# Patient Record
Sex: FEMALE | Race: WHITE | Employment: OTHER | URBAN - METROPOLITAN AREA
[De-identification: names, ages, dates, MRNs, and addresses within clinical notes are randomized per-mention and may not be internally consistent; named-entity substitution may affect disease eponyms.]

---

## 2023-02-14 ENCOUNTER — HOSPITAL ENCOUNTER (OUTPATIENT)
Dept: GENERAL RADIOLOGY | Age: 73
Discharge: HOME | End: 2023-02-14
Payer: MEDICARE

## 2023-02-14 ENCOUNTER — TRANSCRIBE ORDER (OUTPATIENT)
Dept: GENERAL RADIOLOGY | Age: 73
End: 2023-02-14

## 2023-02-14 DIAGNOSIS — S90.31XA CONTUSION OF RIGHT FOOT: Primary | ICD-10-CM

## 2023-02-14 DIAGNOSIS — S90.31XA CONTUSION OF RIGHT FOOT: ICD-10-CM

## 2023-02-14 PROCEDURE — 73630 X-RAY EXAM OF FOOT: CPT

## 2024-03-08 ENCOUNTER — OFFICE VISIT (OUTPATIENT)
Facility: CLINIC | Age: 74
End: 2024-03-08
Payer: MEDICARE

## 2024-03-08 VITALS
WEIGHT: 193 LBS | SYSTOLIC BLOOD PRESSURE: 160 MMHG | DIASTOLIC BLOOD PRESSURE: 98 MMHG | HEART RATE: 99 BPM | OXYGEN SATURATION: 97 % | BODY MASS INDEX: 31.02 KG/M2 | TEMPERATURE: 97.7 F | RESPIRATION RATE: 18 BRPM | HEIGHT: 66 IN

## 2024-03-08 DIAGNOSIS — G47.9 SLEEPING DIFFICULTY: ICD-10-CM

## 2024-03-08 DIAGNOSIS — Z11.59 ENCOUNTER FOR HEPATITIS C SCREENING TEST FOR LOW RISK PATIENT: ICD-10-CM

## 2024-03-08 DIAGNOSIS — Z76.89 ENCOUNTER TO ESTABLISH CARE: ICD-10-CM

## 2024-03-08 DIAGNOSIS — Z13.220 SCREENING CHOLESTEROL LEVEL: ICD-10-CM

## 2024-03-08 DIAGNOSIS — M17.0 BILATERAL PRIMARY OSTEOARTHRITIS OF KNEE: ICD-10-CM

## 2024-03-08 DIAGNOSIS — R03.0 ELEVATED BLOOD PRESSURE READING: Primary | ICD-10-CM

## 2024-03-08 DIAGNOSIS — R03.0 ELEVATED BLOOD PRESSURE READING: ICD-10-CM

## 2024-03-08 DIAGNOSIS — R79.89 OTHER SPECIFIED ABNORMAL FINDINGS OF BLOOD CHEMISTRY: ICD-10-CM

## 2024-03-08 DIAGNOSIS — Z12.31 ENCOUNTER FOR SCREENING MAMMOGRAM FOR MALIGNANT NEOPLASM OF BREAST: ICD-10-CM

## 2024-03-08 DIAGNOSIS — Z02.83 ENCOUNTER FOR DRUG SCREENING: ICD-10-CM

## 2024-03-08 DIAGNOSIS — Z85.828 HISTORY OF SKIN CANCER: ICD-10-CM

## 2024-03-08 PROCEDURE — 99204 OFFICE O/P NEW MOD 45 MIN: CPT | Performed by: FAMILY MEDICINE

## 2024-03-08 PROCEDURE — 1123F ACP DISCUSS/DSCN MKR DOCD: CPT | Performed by: FAMILY MEDICINE

## 2024-03-08 RX ORDER — LORAZEPAM 0.5 MG/1
0.5 TABLET ORAL PRN
COMMUNITY

## 2024-03-08 SDOH — ECONOMIC STABILITY: FOOD INSECURITY: WITHIN THE PAST 12 MONTHS, THE FOOD YOU BOUGHT JUST DIDN'T LAST AND YOU DIDN'T HAVE MONEY TO GET MORE.: NEVER TRUE

## 2024-03-08 SDOH — ECONOMIC STABILITY: HOUSING INSECURITY
IN THE LAST 12 MONTHS, WAS THERE A TIME WHEN YOU DID NOT HAVE A STEADY PLACE TO SLEEP OR SLEPT IN A SHELTER (INCLUDING NOW)?: NO

## 2024-03-08 SDOH — ECONOMIC STABILITY: INCOME INSECURITY: HOW HARD IS IT FOR YOU TO PAY FOR THE VERY BASICS LIKE FOOD, HOUSING, MEDICAL CARE, AND HEATING?: NOT HARD AT ALL

## 2024-03-08 SDOH — ECONOMIC STABILITY: FOOD INSECURITY: WITHIN THE PAST 12 MONTHS, YOU WORRIED THAT YOUR FOOD WOULD RUN OUT BEFORE YOU GOT MONEY TO BUY MORE.: NEVER TRUE

## 2024-03-08 ASSESSMENT — PATIENT HEALTH QUESTIONNAIRE - PHQ9
SUM OF ALL RESPONSES TO PHQ QUESTIONS 1-9: 0
SUM OF ALL RESPONSES TO PHQ QUESTIONS 1-9: 0
SUM OF ALL RESPONSES TO PHQ9 QUESTIONS 1 & 2: 0
1. LITTLE INTEREST OR PLEASURE IN DOING THINGS: NOT AT ALL
SUM OF ALL RESPONSES TO PHQ QUESTIONS 1-9: 0
SUM OF ALL RESPONSES TO PHQ QUESTIONS 1-9: 0
2. FEELING DOWN, DEPRESSED OR HOPELESS: NOT AT ALL

## 2024-03-08 NOTE — PROGRESS NOTES
Subjective  Chief Complaint   Patient presents with    [facility]     HPI:  Charmaine Bell is a 74 y.o. female with medical problems as listed below who presents to establish care. Former patient of Dr. Curry.     Past medical history: Bilateral knee OA, hx of BCC  Medications: Ativan 0.5mg PRN for occasional difficulty sleeping (#30 tabs lasts her one year and tends to take 1/2 tab as needed), probiotic, vitamin D 2000 units daily, Advil daily  Allergies: NKDA  Specialists: Dermatology  Surgical history: tonsillectomy  Family history: Lung cancer (dad).   Social history: No tobacco, sometimes wine with dinner, no drugs. After penitentiary, bought elliptical and is on it 20 minutes per day. Up and down steps at home. Works part time for [employer]. Retired from teaching at [employer].   Colon cancer screening: Last colonoscopy in 2015, patient is unsure whether polyps were removed. Was instructed to return in 5 years but she never went back due to pandemic. She can't recall the GI practice.   Breast cancer screening: Mammogram never done. Agreeable to exam today.   Osteoporosis screening: Declines  Immunizations: Due for PCV20, influenza, RSV, Shingrix,     Knee pain: Has tried Voltaren gel and it didn't help. Taking Advil daily.      Patient Active Problem List   Diagnosis    Bilateral primary osteoarthritis of knee    History of skin cancer    Sleeping difficulty     Family History   Problem Relation Age of Onset    Lung Cancer Father       Social History     Tobacco Use    Smoking status: Never    Smokeless tobacco: Never   Substance Use Topics    Alcohol use: Yes     Comment: occ    Drug use: Never     Current Outpatient Medications on File Prior to Visit   Medication Sig Dispense Refill    LORazepam (ATIVAN) 0.5 MG tablet Take 1 tablet by mouth as needed for Anxiety (prn sleep aid).       No current facility-administered medications on file prior to visit.     No Known Allergies  Review of Systems

## 2024-03-08 NOTE — PROGRESS NOTES
\"Have you been to the ER, urgent care clinic since your last visit?  Hospitalized since your last visit?\"    NO    “Have you seen or consulted any other health care providers outside of [facility] since your last visit?”    NO    “Have you had a colorectal cancer screening such as a colonoscopy/FIT/Cologuard?    NO     Have you had a mammogram?”   NO      Chief Complaint   Patient presents with    Establish Care     BP (!) 167/73 (Site: Right Upper Arm, Position: Sitting, Cuff Size: Large Adult)   Pulse 99   Temp 97.7 °F (36.5 °C) (Temporal)   Resp 18   Ht 1.676 m (5' 6\")   Wt 87.5 kg (193 lb)   SpO2 97%   BMI 31.15 kg/m²

## 2024-03-14 LAB
10OH-CARBAZEPINE/CREAT UR CFM: NOT DETECTED NG/MG{CREAT}
6MAM UR QL CFM: NEGATIVE
6MAM/CREAT UR: NOT DETECTED NG/MG CREAT
7AMINOCLONAZEPAM/CREAT UR: NOT DETECTED NG/MG CREAT
8OH-AMOXAPINE UR QL: NOT DETECTED
8OH-LOXAPINE/CREAT UR CFM: NOT DETECTED NG/MG{CREAT}
A-OH ALPRAZ/CREAT UR: NOT DETECTED NG/MG CREAT
A-OH-TRIAZOLAM/CREAT UR CFM: NOT DETECTED NG/MG CREAT
ALFENTANIL/CREAT UR CFM: NOT DETECTED NG/MG CREAT
ALPHA-HYDROXYMIDAZOLAM, URINE: NOT DETECTED NG/MG CREAT
ALPRAZ/CREAT UR CFM: NOT DETECTED NG/MG CREAT
AMINO CHLOROPYRIDINE, URINE: NOT DETECTED
AMITRIP UR QL CFM: NOT DETECTED
AMOBARBITAL UR QL CFM: NOT DETECTED
AMOXAPINE UR QL: NOT DETECTED
AMPHET/CREAT UR: NOT DETECTED NG/MG CREAT
AMPHETAMINES UR QL CFM: NEGATIVE
ANALGESICS NON-NARCOTIC UR QL: NORMAL
ANTICONVULSANTS UR: NEGATIVE
ANTIDEPRESSANTS UR QL: NEGATIVE
ANTIHISTAMINES UR QL CFM: NEGATIVE
ANTIPSYCHOTICS UR QL SCN: NEGATIVE
APAP UR QL: NOT DETECTED
ARIPIPRAZOLE/CREAT UR CFM: NOT DETECTED NG/MG{CREAT}
ASENAPINE, URINE: NOT DETECTED
ATENOLOL UR QL: NOT DETECTED
ATOMOXETINE/CREAT UR CFM: NOT DETECTED NG/MG{CREAT}
BACLOFEN/CREAT UR CFM: NOT DETECTED NG/MG{CREAT}
BARBITAL UR QL CFM: NOT DETECTED
BARBITURATES UR QL CFM: NEGATIVE
BENZODIAZ UR QL CFM: NORMAL
BENZTROPINE UR QL: NOT DETECTED
BROMPHENIRAMINE UR QL: NOT DETECTED
BUPIVACAINE/CREAT UR CFM: NOT DETECTED NG/MG{CREAT}
BUPRENORPHINE UR QL CFM: NEGATIVE
BUPRENORPHINE/CREAT UR: NOT DETECTED NG/MG CREAT
BUPROPION UR QL: NOT DETECTED
BUTABARBITAL UR QL CFM: NOT DETECTED
BUTALBITAL UR QL CFM: NOT DETECTED
BUTORPHANOL UR QL CFM: NOT DETECTED
BZE/CREAT UR: NOT DETECTED NG/MG CREAT
CAFFEINE UR QL: NOT DETECTED
CANNABINOIDS UR QL CFM: NEGATIVE
CARBAMAZEPINE UR QL: NOT DETECTED
CARBOXYTHC/CREAT UR: NOT DETECTED NG/MG CREAT
CARISOPRODOL UR QL CFM: NOT DETECTED
CHLORPHENIR UR QL CFM: NOT DETECTED
CHLORPROMAZINE UR QL CFM: NOT DETECTED
CITALOPRAM UR QL: NOT DETECTED
CLOBAZAM UR QL: NOT DETECTED
CLOMIPRAMINE UR QL: NOT DETECTED
CLONAZEPAM/CREAT UR CFM: NOT DETECTED NG/MG CREAT
CLONIDINE UR QL: NOT DETECTED
CLOZAPINE UR QL: NOT DETECTED
COCAETHYLENE/CREAT UR CFM: NOT DETECTED NG/MG CREAT
COCAINE UR QL CFM: NEGATIVE
COCAINE/CREAT UR CFM: NOT DETECTED NG/MG CREAT
CODEINE/CREAT UR: NOT DETECTED NG/MG CREAT
CREAT UR-MCNC: 126 MG/DL
CYCLOBENZAPRINE UR QL CFM: NOT DETECTED
D-METHORPHAN UR QL: NOT DETECTED
D-METHORPHAN+LEVORPHANOL UR QL: NOT DETECTED
DESALKYLFLURAZ/CREAT UR: NOT DETECTED NG/MG CREAT
DESIPRAMINE UR QL CFM: NOT DETECTED
DESMETHYLCYCLOBENZAPRINE, URINE: NOT DETECTED
DESMETHYLFLUNITRAZEPAM, URINE: NOT DETECTED NG/MG CREAT
DHC/CREAT UR: NOT DETECTED NG/MG CREAT
DIAZEPAM/CREAT UR: NOT DETECTED NG/MG CREAT
DICLOFENAC/CREAT UR CFM: NOT DETECTED NG/MG{CREAT}
DIETHYLPROPION UR QL CFM: NOT DETECTED
DILTIAZEM UR QL: NOT DETECTED
DIPHENHY UR QL: NOT DETECTED
DOXEPIN UR QL CFM: NOT DETECTED
DOXYLAMINE UR QL: NOT DETECTED
DRUGS UR CFM: NEGATIVE
DRUGS UR: NORMAL
DULOXETINE UR QL CFM: NOT DETECTED
EDDP/CREAT UR: NOT DETECTED NG/MG CREAT
EPHEDRIN+PSEUDO UR QL: NOT DETECTED
ETHANOL UR CFM-MCNC: NOT DETECTED G/DL
ETHANOL UR QL CFM: NEGATIVE
EZOGABINE/CREAT UR CFM: NOT DETECTED NG/MG{CREAT}
FENTANYL UR QL CFM: NEGATIVE
FENTANYL/CREAT UR: NOT DETECTED NG/MG CREAT
FLUNITRAZEPAM UR QL CFM: NOT DETECTED NG/MG CREAT
FLUOXETINE UR QL CFM: NOT DETECTED
FLUPHENAZINE UR QL: NOT DETECTED
FLUVOXAMINE UR QL: NOT DETECTED
GABAPENTIN UR QL: NOT DETECTED
GUAIFENESIN/CREAT UR CFM: NOT DETECTED NG/MG{CREAT}
HALLUCINOGENS UR: NEGATIVE
HALOPERIDOL UR QL CFM: NOT DETECTED
HYDROCODONE/CREAT UR: NOT DETECTED NG/MG CREAT
HYDROMORPHONE/CREAT UR: NOT DETECTED NG/MG CREAT
HYDROXYZINE UR QL: NOT DETECTED
HYPNOTICS UR QL SCN: NEGATIVE
IBUPROFEN UR QL: PRESENT
ILOPERIDONE, URINE: NOT DETECTED
IMIPRAMINE UR QL CFM: NOT DETECTED
KETAMINE UR QL CFM: NOT DETECTED
KETOPROFEN/CREAT UR CFM: NOT DETECTED NG/MG{CREAT}
LAMOTRIGINE/CREAT UR CFM: NOT DETECTED NG/MG{CREAT}
LEVEL OF DETECTION: NORMAL
LEVETIRACETAM/CREAT UR CFM: NOT DETECTED NG/MG{CREAT}
LIDOCAIN UR QL: NOT DETECTED
LOCAL ANESTHETICS SCREEN URINE: NEGATIVE
LORAZEPAM/CREAT UR: 25 NG/MG CREAT
LOXAPINE UR QL: NOT DETECTED
LURASIDONE UR CFM-MCNC: NOT DETECTED NG/ML
M-CPP UR QL: NOT DETECTED
MAPROTILINE UR QL: NOT DETECTED
MDA/CREAT UR: NOT DETECTED NG/MG CREAT
MDEA UR QL: NOT DETECTED
MDMA/CREAT UR: NOT DETECTED NG/MG CREAT
ME-PHENIDATE UR QL CFM: NOT DETECTED
MEPERIDINE UR QL CFM: NOT DETECTED
MEPHOBARBITAL UR QL CFM: NOT DETECTED
MEPIVACAINE UR QL: NOT DETECTED
MEPROBAMATE UR QL CFM: NOT DETECTED
MESORIDAZINE UR QL CFM: NOT DETECTED
METAXALONE/CREAT UR CFM: NOT DETECTED NG/MG{CREAT}
METHADONE UR QL CFM: NEGATIVE
METHADONE/CREAT UR: NOT DETECTED NG/MG CREAT
METHAMPHET/CREAT UR: NOT DETECTED NG/MG CREAT
METHCATHINONE/CREAT UR CFM: NOT DETECTED NG/MG{CREAT}
METHOCARBAMOL UR QL: NOT DETECTED
METOPROLOL UR QL: NOT DETECTED
MIDAZOLAM/CREAT UR CFM: NOT DETECTED NG/MG CREAT
MILNACIPRAN/CREAT UR CFM: NOT DETECTED NG/MG{CREAT}
MIRTAZAPINE UR QL CFM: NOT DETECTED
MOLINDONE/CREAT UR CFM: NOT DETECTED NG/MG{CREAT}
MORPHINE/CREAT UR: NOT DETECTED NG/MG CREAT
MUSCLE RELAXANTS SCREEN URINE: NEGATIVE
N-NORTRAMADOL/CREAT UR CFM: NOT DETECTED NG/MG CREAT
NALBUPHINE UR QL CFM: NOT DETECTED
NALTREXONE UR QL CFM: NOT DETECTED
NAPROXEN/CREAT UR CFM: NOT DETECTED NG/MG{CREAT}
NARCOTICS UR: NEGATIVE
NEFAZODONE UR QL: NOT DETECTED
NORBUPRENORPHINE/CREAT UR: NOT DETECTED NG/MG CREAT
NORCITALOPRAM UR QL: NOT DETECTED
NORCLOMIPRAMINE UR QL: NOT DETECTED
NORCLOZAPINE UR QL: NOT DETECTED
NORCODEINE/CREAT UR CFM: NOT DETECTED NG/MG CREAT
NORDIAZEPAM/CREAT UR: NOT DETECTED NG/MG CREAT
NORDOXEPIN UR QL: NOT DETECTED
NORFENTANYL/CREAT UR: NOT DETECTED NG/MG CREAT
NORFLUOXETINE UR QL CFM: NOT DETECTED
NORHYDROCODONE/CREAT UR: NOT DETECTED NG/MG CREAT
NORKETAMINE UR CFM-MCNC: NOT DETECTED NG/ML
NORMEPERIDINE UR QL CFM: NOT DETECTED
NORMORPHINE UR-MCNC: NOT DETECTED NG/MG CREAT
NOROXYCODONE/CREAT UR: NOT DETECTED NG/MG CREAT
NOROXYMORPHONE/CREAT UR CFM: NOT DETECTED NG/MG CREAT
NORPROPOXYPH UR QL CFM: NOT DETECTED
NORSERTRALINE UR QL: NOT DETECTED
NORTRIP UR QL CFM: NOT DETECTED
O-NORTRAMADOL UR CFM-MCNC: NOT DETECTED NG/MG CREAT
ODV UR QL: NOT DETECTED
OH-BUPROPION UR QL CFM: NOT DETECTED
OLANZAPINE UR QL: NOT DETECTED
OPIATES UR QL CFM: NEGATIVE
ORPHENADRINE UR QL: NOT DETECTED
OXAPROZIN/CREAT UR CFM: NOT DETECTED NG/MG{CREAT}
OXAZEPAM/CREAT UR: NOT DETECTED NG/MG CREAT
OXYCODONE UR QL CFM: NEGATIVE
OXYCODONE/CREAT UR: NOT DETECTED NG/MG CREAT
OXYMORPHONE/CREAT UR: NOT DETECTED NG/MG CREAT
PAROXETINE UR QL: NOT DETECTED
PCP UR QL CFM: NOT DETECTED
PENTAZOCINE UR QL CFM: NOT DETECTED
PENTOBARB UR QL CFM: NOT DETECTED
PERPHENAZINE UR QL: NOT DETECTED
PHENMETRAZINE UR QL CFM: NOT DETECTED
PHENOBARB UR QL CFM: NOT DETECTED
PHENTERMINE UR QL CFM: NOT DETECTED
PHENYTOIN UR QL: NOT DETECTED
PIMOZIDE/CREAT UR CFM: NOT DETECTED NG/MG{CREAT}
PPA UR QL: NOT DETECTED
PPAA UR QL: NOT DETECTED
PREGABALIN UR QL CFM: NOT DETECTED
PRIMIDONE/CREAT UR CFM: NOT DETECTED NG/MG{CREAT}
PROCAINE UR QL: NOT DETECTED
PROCHLORPERAZINE UR QL: NOT DETECTED
PROMETHAZINE UR QL: NOT DETECTED
PROPOXYPH UR QL CFM: NOT DETECTED
PROPRANOLOL UR QL: NOT DETECTED
PROTRIP UR QL: NOT DETECTED
PYRILAMINE UR QL: NOT DETECTED
QUETIAPINE UR QL: NOT DETECTED
RISPERIDONE UR QL: NOT DETECTED
RUFINAMIDE/CREAT UR CFM: NOT DETECTED NG/MG{CREAT}
SALICYLATES UR QL: NOT DETECTED
SECOBARBITAL UR QL CFM: NOT DETECTED
SERTRALINE UR QL: NOT DETECTED
SUFENTANIL/CREAT UR CFM: NOT DETECTED NG/MG CREAT
SYMPATHOMIMETICS UR QL CFM: NEGATIVE
TAPENTADOL UR QL CFM: NEGATIVE
TAPENTADOL/CREAT UR: NOT DETECTED NG/MG CREAT
TEMAZEPAM/CREAT UR: NOT DETECTED NG/MG CREAT
THEOPHYLLINE/CREAT UR CFM: NOT DETECTED NG/MG{CREAT}
THIOPENTAL UR QL CFM: NOT DETECTED
THIORIDAZINE UR QL CFM: NOT DETECTED
THIOTHIXENE UR QL: NOT DETECTED
TIAGABINE/CREAT UR CFM: NOT DETECTED NG/MG{CREAT}
TIZANIDINE/CREAT UR CFM: NOT DETECTED NG/MG{CREAT}
TOPIRAMATE/CREAT UR CFM: NOT DETECTED NG/MG{CREAT}
TRAMADOL UR QL CFM: NOT DETECTED NG/MG CREAT
TRAZODONE UR QL: NOT DETECTED
TRIFPERAZINE UR QL: NOT DETECTED
TRIMIPRAMINE UR QL: NOT DETECTED
TRIPROLIDINE SER/PLAS/URN QL SCN: NOT DETECTED
VENLAFAXINE UR QL: NOT DETECTED
VERAPAMIL UR QL: NOT DETECTED
VILAZ UR QL: NOT DETECTED
ZALEPLON, URINE: NOT DETECTED
ZIPRASIDONE/CREAT UR CFM: NOT DETECTED NG/MG{CREAT}
ZOLPIDEM UR QL CFM: NOT DETECTED
ZOLPIDEM/CREAT UR: NOT DETECTED
ZONISAMIDE/CREAT UR CFM: NOT DETECTED NG/MG{CREAT}
ZOPICLONE UR QL: NOT DETECTED

## 2024-03-26 ENCOUNTER — HOSPITAL ENCOUNTER (OUTPATIENT)
Facility: HOSPITAL | Age: 74
Discharge: HOME | End: 2024-03-29
Attending: FAMILY MEDICINE
Payer: MEDICARE

## 2024-03-26 DIAGNOSIS — Z12.31 ENCOUNTER FOR SCREENING MAMMOGRAM FOR MALIGNANT NEOPLASM OF BREAST: ICD-10-CM

## 2024-03-26 PROCEDURE — 77063 BREAST TOMOSYNTHESIS BI: CPT

## 2024-03-29 PROBLEM — G47.9 SLEEPING DIFFICULTY: Status: ACTIVE | Noted: 2024-03-29

## 2024-03-29 PROBLEM — Z85.828 HISTORY OF SKIN CANCER: Status: ACTIVE | Noted: 2024-03-29

## 2024-03-29 PROBLEM — M17.0 BILATERAL PRIMARY OSTEOARTHRITIS OF KNEE: Status: ACTIVE | Noted: 2024-03-29

## 2024-04-05 LAB
ERYTHROCYTE [DISTWIDTH] IN BLOOD BY AUTOMATED COUNT: 12.3 % (ref 11.7–15.4)
HCT VFR BLD AUTO: 44.9 % (ref 34–46.6)
HGB BLD-MCNC: 14.9 G/DL (ref 11.1–15.9)
MCH RBC QN AUTO: 30.8 PG (ref 26.6–33)
MCHC RBC AUTO-ENTMCNC: 33.2 G/DL (ref 31.5–35.7)
MCV RBC AUTO: 93 FL (ref 79–97)
PLATELET # BLD AUTO: 268 X10E3/UL (ref 150–450)
RBC # BLD AUTO: 4.83 X10E6/UL (ref 3.77–5.28)
WBC # BLD AUTO: 8 X10E3/UL (ref 3.4–10.8)

## 2024-04-06 LAB
ALBUMIN SERPL-MCNC: 4.3 G/DL (ref 3.8–4.8)
ALBUMIN/GLOB SERPL: 1.7 {RATIO} (ref 1.2–2.2)
ALP SERPL-CCNC: 77 IU/L (ref 44–121)
ALT SERPL-CCNC: 21 IU/L (ref 0–32)
AST SERPL-CCNC: 21 IU/L (ref 0–40)
BILIRUB SERPL-MCNC: 0.2 MG/DL (ref 0–1.2)
BUN SERPL-MCNC: 15 MG/DL (ref 8–27)
BUN/CREAT SERPL: 23 (ref 12–28)
CALCIUM SERPL-MCNC: 9.3 MG/DL (ref 8.7–10.3)
CHLORIDE SERPL-SCNC: 102 MMOL/L (ref 96–106)
CHOLEST SERPL-MCNC: 254 MG/DL (ref 100–199)
CO2 SERPL-SCNC: 21 MMOL/L (ref 20–29)
CREAT SERPL-MCNC: 0.66 MG/DL (ref 0.57–1)
EGFRCR SERPLBLD CKD-EPI 2021: 92 ML/MIN/1.73
GLOBULIN SER CALC-MCNC: 2.6 G/DL (ref 1.5–4.5)
GLUCOSE SERPL-MCNC: 99 MG/DL (ref 70–99)
HCV IGG SERPL QL IA: NON REACTIVE
HDLC SERPL-MCNC: 50 MG/DL
LDLC SERPL CALC-MCNC: 164 MG/DL (ref 0–99)
POTASSIUM SERPL-SCNC: 3.9 MMOL/L (ref 3.5–5.2)
PROT SERPL-MCNC: 6.9 G/DL (ref 6–8.5)
SODIUM SERPL-SCNC: 139 MMOL/L (ref 134–144)
TRIGL SERPL-MCNC: 215 MG/DL (ref 0–149)
VLDLC SERPL CALC-MCNC: 40 MG/DL (ref 5–40)

## 2024-06-21 ENCOUNTER — OFFICE VISIT (OUTPATIENT)
Facility: CLINIC | Age: 74
End: 2024-06-21
Payer: MEDICARE

## 2024-06-21 VITALS
HEART RATE: 100 BPM | HEIGHT: 66 IN | RESPIRATION RATE: 18 BRPM | SYSTOLIC BLOOD PRESSURE: 138 MMHG | OXYGEN SATURATION: 97 % | WEIGHT: 183 LBS | DIASTOLIC BLOOD PRESSURE: 88 MMHG | BODY MASS INDEX: 29.41 KG/M2 | TEMPERATURE: 97.8 F

## 2024-06-21 DIAGNOSIS — R03.0 ELEVATED BLOOD PRESSURE READING: ICD-10-CM

## 2024-06-21 DIAGNOSIS — E78.00 PURE HYPERCHOLESTEROLEMIA: Primary | ICD-10-CM

## 2024-06-21 DIAGNOSIS — G47.9 SLEEPING DIFFICULTY: ICD-10-CM

## 2024-06-21 PROBLEM — G47.00 INSOMNIA: Status: RESOLVED | Noted: 2024-06-21 | Resolved: 2024-06-21

## 2024-06-21 PROBLEM — G47.00 INSOMNIA: Status: ACTIVE | Noted: 2024-06-21

## 2024-06-21 PROBLEM — J30.2 SEASONAL ALLERGIES: Status: ACTIVE | Noted: 2024-06-21

## 2024-06-21 PROCEDURE — 99214 OFFICE O/P EST MOD 30 MIN: CPT | Performed by: FAMILY MEDICINE

## 2024-06-21 PROCEDURE — 1123F ACP DISCUSS/DSCN MKR DOCD: CPT | Performed by: FAMILY MEDICINE

## 2024-06-21 RX ORDER — LORAZEPAM 0.5 MG/1
0.5 TABLET ORAL PRN
Qty: 30 TABLET | Refills: 0 | Status: SHIPPED | OUTPATIENT
Start: 2024-06-21 | End: 2024-12-18

## 2024-06-21 NOTE — PROGRESS NOTES
Subjective  Chief Complaint   Patient presents with    Follow-up     HPI:  Charmaine Bell is a 74 y.o. female with medical problems as listed below who presents for blood pressure follow up.     Elevated blood pressure reading: Patient has lost 10lbs since her last visit. At home, she checks her BP every so often. It is averaging 130's/80's.     HLD: Would like to defer medication for now. She has altered her diet since her visit in March and she is exercising.     Sleeping difficulty: Requesting refills of Ativan as she is almost out of her current supply. She only takes this PRN and says that it has been quite a while since she has needed to take it.     Colon cancer screening: Patient cannot recall who completed her last colonoscopy. It was last completed in 2015 and she was instructed to return in 5 years. She would like to defer this until the fall.     Patient Active Problem List   Diagnosis    Bilateral primary osteoarthritis of knee    History of skin cancer    Sleeping difficulty    Seasonal allergies    Pure hypercholesterolemia     Family History   Problem Relation Age of Onset    Lung Cancer Father       Social History     Tobacco Use    Smoking status: Never    Smokeless tobacco: Never   Substance Use Topics    Alcohol use: Yes     Comment: occ    Drug use: Never     Current Outpatient Medications on File Prior to Visit   Medication Sig Dispense Refill    Cholecalciferol 50 MCG (2000 UT) TABS Take 1 tablet by mouth daily       No current facility-administered medications on file prior to visit.     No Known Allergies  Review of Systems   Constitutional: Negative.          Objective  Vitals:    06/21/24 0759   BP: 138/88   Pulse: 100   Resp: 18   Temp: 97.8 °F (36.6 °C)   SpO2: 97%     Physical Exam  Constitutional:       General: She is not in acute distress.     Appearance: Normal appearance. She is not ill-appearing.   HENT:      Head: Normocephalic and atraumatic.   Eyes:      Extraocular

## 2024-06-21 NOTE — PROGRESS NOTES
\"Have you been to the ER, urgent care clinic since your last visit?  Hospitalized since your last visit?\"    NO    “Have you seen or consulted any other health care providers outside of [facility] since your last visit?”    NO      No cologuard on file  No FIT/FOBT on file   No flexible sigmoidoscopy on file     Chief Complaint   Patient presents with    Follow-up     /88 (Site: Left Upper Arm, Position: Sitting, Cuff Size: Large Adult)   Pulse 100   Temp 97.8 °F (36.6 °C) (Temporal)   Resp 18   Ht 1.676 m (5' 6\")   Wt 83 kg (183 lb)   SpO2 97%   BMI 29.54 kg/m²       Click Here for Release of Records Request

## 2025-04-07 SDOH — ECONOMIC STABILITY: TRANSPORTATION INSECURITY
IN THE PAST 12 MONTHS, HAS THE LACK OF TRANSPORTATION KEPT YOU FROM MEDICAL APPOINTMENTS OR FROM GETTING MEDICATIONS?: NO

## 2025-04-07 SDOH — HEALTH STABILITY: PHYSICAL HEALTH: ON AVERAGE, HOW MANY MINUTES DO YOU ENGAGE IN EXERCISE AT THIS LEVEL?: 20 MIN

## 2025-04-07 SDOH — ECONOMIC STABILITY: INCOME INSECURITY: IN THE LAST 12 MONTHS, WAS THERE A TIME WHEN YOU WERE NOT ABLE TO PAY THE MORTGAGE OR RENT ON TIME?: NO

## 2025-04-07 SDOH — ECONOMIC STABILITY: FOOD INSECURITY: WITHIN THE PAST 12 MONTHS, YOU WORRIED THAT YOUR FOOD WOULD RUN OUT BEFORE YOU GOT MONEY TO BUY MORE.: NEVER TRUE

## 2025-04-07 SDOH — ECONOMIC STABILITY: FOOD INSECURITY: WITHIN THE PAST 12 MONTHS, THE FOOD YOU BOUGHT JUST DIDN'T LAST AND YOU DIDN'T HAVE MONEY TO GET MORE.: NEVER TRUE

## 2025-04-07 SDOH — HEALTH STABILITY: PHYSICAL HEALTH: ON AVERAGE, HOW MANY DAYS PER WEEK DO YOU ENGAGE IN MODERATE TO STRENUOUS EXERCISE (LIKE A BRISK WALK)?: 2 DAYS

## 2025-04-07 SDOH — ECONOMIC STABILITY: TRANSPORTATION INSECURITY
IN THE PAST 12 MONTHS, HAS LACK OF TRANSPORTATION KEPT YOU FROM MEETINGS, WORK, OR FROM GETTING THINGS NEEDED FOR DAILY LIVING?: NO

## 2025-04-07 ASSESSMENT — LIFESTYLE VARIABLES
HOW MANY STANDARD DRINKS CONTAINING ALCOHOL DO YOU HAVE ON A TYPICAL DAY: 1 OR 2
HOW OFTEN DO YOU HAVE A DRINK CONTAINING ALCOHOL: 4
HOW OFTEN DO YOU HAVE A DRINK CONTAINING ALCOHOL: 2-3 TIMES A WEEK
HOW MANY STANDARD DRINKS CONTAINING ALCOHOL DO YOU HAVE ON A TYPICAL DAY: 1
HOW OFTEN DO YOU HAVE SIX OR MORE DRINKS ON ONE OCCASION: 1

## 2025-04-07 ASSESSMENT — PATIENT HEALTH QUESTIONNAIRE - PHQ9
SUM OF ALL RESPONSES TO PHQ QUESTIONS 1-9: 0
1. LITTLE INTEREST OR PLEASURE IN DOING THINGS: NOT AT ALL
SUM OF ALL RESPONSES TO PHQ QUESTIONS 1-9: 0
2. FEELING DOWN, DEPRESSED OR HOPELESS: NOT AT ALL

## 2025-04-08 ENCOUNTER — OFFICE VISIT (OUTPATIENT)
Facility: CLINIC | Age: 75
End: 2025-04-08
Payer: MEDICARE

## 2025-04-08 VITALS
HEART RATE: 90 BPM | SYSTOLIC BLOOD PRESSURE: 139 MMHG | DIASTOLIC BLOOD PRESSURE: 73 MMHG | OXYGEN SATURATION: 99 % | RESPIRATION RATE: 17 BRPM | HEIGHT: 66 IN | TEMPERATURE: 97.9 F | BODY MASS INDEX: 29.54 KG/M2

## 2025-04-08 DIAGNOSIS — E55.9 VITAMIN D DEFICIENCY: ICD-10-CM

## 2025-04-08 DIAGNOSIS — E78.00 PURE HYPERCHOLESTEROLEMIA: ICD-10-CM

## 2025-04-08 DIAGNOSIS — G47.9 SLEEPING DIFFICULTY: ICD-10-CM

## 2025-04-08 DIAGNOSIS — Z00.00 MEDICARE ANNUAL WELLNESS VISIT, SUBSEQUENT: Primary | ICD-10-CM

## 2025-04-08 DIAGNOSIS — Z51.81 MEDICATION MONITORING ENCOUNTER: ICD-10-CM

## 2025-04-08 DIAGNOSIS — Z85.828 HISTORY OF SKIN CANCER: ICD-10-CM

## 2025-04-08 PROCEDURE — 1160F RVW MEDS BY RX/DR IN RCRD: CPT | Performed by: FAMILY MEDICINE

## 2025-04-08 PROCEDURE — G0439 PPPS, SUBSEQ VISIT: HCPCS | Performed by: FAMILY MEDICINE

## 2025-04-08 PROCEDURE — 1159F MED LIST DOCD IN RCRD: CPT | Performed by: FAMILY MEDICINE

## 2025-04-08 PROCEDURE — 1123F ACP DISCUSS/DSCN MKR DOCD: CPT | Performed by: FAMILY MEDICINE

## 2025-04-08 PROCEDURE — 99214 OFFICE O/P EST MOD 30 MIN: CPT | Performed by: FAMILY MEDICINE

## 2025-04-08 RX ORDER — LORAZEPAM 0.5 MG/1
0.25 TABLET ORAL NIGHTLY PRN
Qty: 15 TABLET | Refills: 1 | Status: SHIPPED | OUTPATIENT
Start: 2025-04-08 | End: 2025-06-07

## 2025-04-08 NOTE — PATIENT INSTRUCTIONS
Learning About Being Active as an Older Adult  Why is being active important as you get older?     Being active is one of the best things you can do for your health. And it's never too late to start. Being active--or getting active, if you aren't already--has definite benefits. It can:  Give you more energy,  Keep your mind sharp.  Improve balance to reduce your risk of falls.  Help you manage chronic illness with fewer medicines.  No matter how old you are, how fit you are, or what health problems you have, there is a form of activity that will work for you. And the more physical activity you can do, the better your overall health will be.  What kinds of activity can help you stay healthy?  Being more active will make your daily activities easier. Physical activity includes planned exercise and things you do in daily life. There are four types of activity:  Aerobic.  Doing aerobic activity makes your heart and lungs strong.  Includes walking, dancing, and gardening.  Aim for at least 2½ hours spread throughout the week.  It improves your energy and can help you sleep better.  Muscle-strengthening.  This type of activity can help maintain muscle and strengthen bones.  Includes climbing stairs, using resistance bands, and lifting or carrying heavy loads.  Aim for at least twice a week.  It can help protect the knees and other joints.  Stretching.  Stretching gives you better range of motion in joints and muscles.  Includes upper arm stretches, calf stretches, and gentle yoga.  Aim for at least twice a week, preferably after your muscles are warmed up from other activities.  It can help you function better in daily life.  Balancing.  This helps you stay coordinated and have good posture.  Includes heel-to-toe walking, jackie chi, and certain types of yoga.  Aim for at least 3 days a week.  It can reduce your risk of falling.  Even if you have a hard time meeting the recommendations, it's better to be more active

## 2025-04-08 NOTE — PROGRESS NOTES
Medicare Annual Wellness Visit    Charmaine Bell is here for Medicare AWV    Assessment & Plan   Medicare annual wellness visit, subsequent  -     Comprehensive Metabolic Panel  -     Lipid Panel  -     CBC  Chart reviewed and updated as needed. Care gaps discussed. Declines DEXA. Defers colonoscopy and mammogram. Annual labs ordered and pending.     Pure hypercholesterolemia  -     Lipid Panel  Labs pending. Further recommendations to follow.     Medication monitoring encounter  Sleeping difficulty  -     TOXASSURE SELECT 13  -     LORazepam (ATIVAN) 0.5 MG tablet; Take 0.5 tablets by mouth nightly as needed (Insomnia) for up to 60 days. Max Daily Amount: 0.25 mg, Disp-15 tablet, R-1Normal  PDMP reviewed. UDS/CSA updated. Refills of lorazepam 0.25mg qHS PRN sent to pharmacy.     Vitamin D deficiency  -     Vitamin D 25 Hydroxy  Labs pending. For now, continue cholecalciferol 2000 units daily.     History of skin cancer  Followed by Dermatology annually.        Return in about 1 year (around 4/8/2026) for Medicare wellness exam.     Subjective   The following acute and/or chronic problems were also addressed today:    Diet: Cut out sugar mostly. Salads for lunch. Limited red meat. Well rounded diet. One diet soda daily. Drinks water.   Exercise: Works out using elliptical machine at home. She hurt her knee recently but plans to get back to it.   Specialists: Dermatology  Vaccines: Due for Shingrix, PCV20, RSV, Td. Declines RSV. Will go to pharmacy for others.   Colon cancer screening: Last colonoscopy in 2015, uncertain whether polyps were removed. Instructed to return in 5 years. Uncertain of GI practice. Patient would liek to defer for now.   Breast cancer screening: Last mammogram in 03/2024, no evidence of malignancy. Would like to defer until next year.   Osteoporosis screening: Declines DEXA.    Sleeping difficulty: Requesting refills of Ativan. Takes 1/2 tab PRN for difficulty sleeping. Does not require

## 2025-04-08 NOTE — PROGRESS NOTES
\"Have you been to the ER, urgent care clinic since your last visit?  Hospitalized since your last visit?\"    NO    “Have you seen or consulted any other health care providers outside our system since your last visit?”    NO      “Have you had a colorectal cancer screening such as a colonoscopy/FIT/Cologuard?    NO    No colonoscopy on file  No cologuard on file  No FIT/FOBT on file   No flexible sigmoidoscopy on file     Chief Complaint   Patient presents with    Medicare AWV     /73 (BP Site: Right Upper Arm, Patient Position: Sitting, BP Cuff Size: Medium Adult)   Pulse 90   Temp 97.9 °F (36.6 °C) (Temporal)   Resp 17   Ht 1.676 m (5' 6\")   SpO2 99%   BMI 29.54 kg/m²

## 2025-04-19 LAB
25(OH)D3+25(OH)D2 SERPL-MCNC: 43.4 NG/ML (ref 30–100)
ALBUMIN SERPL-MCNC: 4.4 G/DL (ref 3.8–4.8)
ALP SERPL-CCNC: 89 IU/L (ref 44–121)
ALT SERPL-CCNC: 21 IU/L (ref 0–32)
AST SERPL-CCNC: 22 IU/L (ref 0–40)
BILIRUB SERPL-MCNC: 0.4 MG/DL (ref 0–1.2)
BUN SERPL-MCNC: 15 MG/DL (ref 8–27)
BUN/CREAT SERPL: 25 (ref 12–28)
CALCIUM SERPL-MCNC: 9.2 MG/DL (ref 8.7–10.3)
CHLORIDE SERPL-SCNC: 102 MMOL/L (ref 96–106)
CHOLEST SERPL-MCNC: 249 MG/DL (ref 100–199)
CO2 SERPL-SCNC: 23 MMOL/L (ref 20–29)
CREAT SERPL-MCNC: 0.6 MG/DL (ref 0.57–1)
EGFRCR SERPLBLD CKD-EPI 2021: 94 ML/MIN/1.73
ERYTHROCYTE [DISTWIDTH] IN BLOOD BY AUTOMATED COUNT: 11.8 % (ref 11.7–15.4)
GLOBULIN SER CALC-MCNC: 2.4 G/DL (ref 1.5–4.5)
GLUCOSE SERPL-MCNC: 83 MG/DL (ref 70–99)
HCT VFR BLD AUTO: 46.9 % (ref 34–46.6)
HDLC SERPL-MCNC: 60 MG/DL
HGB BLD-MCNC: 15.3 G/DL (ref 11.1–15.9)
LDLC SERPL CALC-MCNC: 156 MG/DL (ref 0–99)
MCH RBC QN AUTO: 31.2 PG (ref 26.6–33)
MCHC RBC AUTO-ENTMCNC: 32.6 G/DL (ref 31.5–35.7)
MCV RBC AUTO: 96 FL (ref 79–97)
PLATELET # BLD AUTO: 262 X10E3/UL (ref 150–450)
POTASSIUM SERPL-SCNC: 4.3 MMOL/L (ref 3.5–5.2)
PROT SERPL-MCNC: 6.8 G/DL (ref 6–8.5)
RBC # BLD AUTO: 4.9 X10E6/UL (ref 3.77–5.28)
SODIUM SERPL-SCNC: 140 MMOL/L (ref 134–144)
TRIGL SERPL-MCNC: 184 MG/DL (ref 0–149)
VLDLC SERPL CALC-MCNC: 33 MG/DL (ref 5–40)
WBC # BLD AUTO: 6.7 X10E3/UL (ref 3.4–10.8)

## 2025-04-23 LAB — DRUGS UR: NORMAL

## 2025-04-30 ENCOUNTER — COMMUNITY OUTREACH (OUTPATIENT)
Facility: CLINIC | Age: 75
End: 2025-04-30

## 2025-06-22 ENCOUNTER — RESULTS FOLLOW-UP (OUTPATIENT)
Facility: CLINIC | Age: 75
End: 2025-06-22

## 2025-07-21 DIAGNOSIS — G47.9 SLEEPING DIFFICULTY: ICD-10-CM

## 2025-07-22 NOTE — TELEPHONE ENCOUNTER
My chart ticket scheduling has been sent to the patient .        Requested Prescriptions     Pending Prescriptions Disp Refills    LORazepam (ATIVAN) 0.5 MG tablet [Pharmacy Med Name: LORAZEPAM 0.5MG TABLETS] 30 tablet      Sig: TAKE 1 TABLET BY MOUTH EVERY NIGHT AT BEDTIME AS NEEDED FOR UP  DAYS

## 2025-07-24 RX ORDER — LORAZEPAM 0.5 MG/1
0.5 TABLET ORAL NIGHTLY PRN
Qty: 30 TABLET | Refills: 0 | Status: SHIPPED | OUTPATIENT
Start: 2025-07-24 | End: 2026-07-19